# Patient Record
Sex: FEMALE | Race: ASIAN | NOT HISPANIC OR LATINO | URBAN - METROPOLITAN AREA
[De-identification: names, ages, dates, MRNs, and addresses within clinical notes are randomized per-mention and may not be internally consistent; named-entity substitution may affect disease eponyms.]

---

## 2021-05-20 ENCOUNTER — EMERGENCY (EMERGENCY)
Facility: HOSPITAL | Age: 56
LOS: 1 days | Discharge: HOME | End: 2021-05-20
Attending: EMERGENCY MEDICINE | Admitting: EMERGENCY MEDICINE
Payer: SELF-PAY

## 2021-05-20 VITALS
HEART RATE: 76 BPM | DIASTOLIC BLOOD PRESSURE: 85 MMHG | SYSTOLIC BLOOD PRESSURE: 173 MMHG | OXYGEN SATURATION: 98 % | RESPIRATION RATE: 18 BRPM | TEMPERATURE: 98 F

## 2021-05-20 VITALS
DIASTOLIC BLOOD PRESSURE: 85 MMHG | HEART RATE: 67 BPM | RESPIRATION RATE: 19 BRPM | OXYGEN SATURATION: 99 % | SYSTOLIC BLOOD PRESSURE: 145 MMHG

## 2021-05-20 PROCEDURE — 73130 X-RAY EXAM OF HAND: CPT

## 2021-05-20 PROCEDURE — 73030 X-RAY EXAM OF SHOULDER: CPT

## 2021-05-20 PROCEDURE — 99284 EMERGENCY DEPT VISIT MOD MDM: CPT | Mod: 25

## 2021-05-20 PROCEDURE — 72170 X-RAY EXAM OF PELVIS: CPT | Mod: 26

## 2021-05-20 PROCEDURE — 90715 TDAP VACCINE 7 YRS/> IM: CPT

## 2021-05-20 PROCEDURE — 72170 X-RAY EXAM OF PELVIS: CPT

## 2021-05-20 PROCEDURE — 90471 IMMUNIZATION ADMIN: CPT

## 2021-05-20 PROCEDURE — 99284 EMERGENCY DEPT VISIT MOD MDM: CPT

## 2021-05-20 PROCEDURE — 72100 X-RAY EXAM L-S SPINE 2/3 VWS: CPT

## 2021-05-20 PROCEDURE — 72100 X-RAY EXAM L-S SPINE 2/3 VWS: CPT | Mod: 26

## 2021-05-20 PROCEDURE — 73130 X-RAY EXAM OF HAND: CPT | Mod: 26,RT

## 2021-05-20 PROCEDURE — 73030 X-RAY EXAM OF SHOULDER: CPT | Mod: 26,RT

## 2021-05-20 RX ORDER — TETANUS TOXOID, REDUCED DIPHTHERIA TOXOID AND ACELLULAR PERTUSSIS VACCINE, ADSORBED 5; 2.5; 8; 8; 2.5 [IU]/.5ML; [IU]/.5ML; UG/.5ML; UG/.5ML; UG/.5ML
0.5 SUSPENSION INTRAMUSCULAR ONCE
Refills: 0 | Status: COMPLETED | OUTPATIENT
Start: 2021-05-20 | End: 2021-05-20

## 2021-05-20 RX ADMIN — TETANUS TOXOID, REDUCED DIPHTHERIA TOXOID AND ACELLULAR PERTUSSIS VACCINE, ADSORBED 0.5 MILLILITER(S): 5; 2.5; 8; 8; 2.5 SUSPENSION INTRAMUSCULAR at 11:50
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
## 2021-05-20 NOTE — ED ADULT NURSE NOTE - CHPI ED NUR SYMPTOMS NEG
no acting out behaviors/no bruising/no crying/no difficulty bearing weight/no dizziness/no headache/no laceration/no loss of consciousness/no neck tenderness

## 2021-05-20 NOTE — ED PROVIDER NOTE - NSFOLLOWUPINSTRUCTIONS_ED_ALL_ED_FT
Take tylenol or ibuprofen for pain. You will have more pain tonight and tomorrow, this is normal. Drink plenty of fluids. You received a tetanus booster today  Contusion in Adults    AMBULATORY CARE:    A contusion is a bruise that appears on your skin after an injury. A bruise happens when small blood vessels tear but skin does not. Blood leaks into nearby tissue, such as soft tissue or muscle.    Other signs and symptoms you may have with a contusion:   •Pain that increases when you touch the bruise, walk, or use the area around the bruise      •Swelling or a lump at the site of the bruise or near it      •Red, blue, or black skin that may change to green or yellow after a few days      •Stiffness or problems moving the bruised area of your body      Seek care immediately if:   •You have new trouble moving the injured area.      •You have tingling or numbness in or near the injured area.      •Your hand or foot below the bruise gets cold or turns pale.       Call your doctor if:   •You find a new lump in the injured area.      •Your symptoms do not improve with treatment after 4 to 5 days.      •You have questions or concerns about your condition or care.      Treatment may not be needed. The following may be needed if you have a serious injury:  •NSAIDs, such as ibuprofen, help decrease swelling, pain, and fever. This medicine is available with or without a doctor's order. NSAIDs can cause stomach bleeding or kidney problems in certain people. If you take blood thinner medicine, always ask your healthcare provider if NSAIDs are safe for you. Always read the medicine label and follow directions.      •Prescription pain medicine may be given. Ask your healthcare provider how to take this medicine safely. Some prescription pain medicines contain acetaminophen. Do not take other medicines that contain acetaminophen without talking to your healthcare provider. Too much acetaminophen may cause liver damage. Prescription pain medicine may cause constipation. Ask your healthcare provider how to prevent or treat constipation.       •Aspiration is a procedure to drain pooled blood in your muscle. This may help prevent increased pressure in the muscle.      •Surgery may be done to repair a tear in the muscle or relieve pressure in the muscle caused by swelling.      Help a contusion heal:   •Rest the injured area or use it less than usual. If you bruised your leg or foot, you may need crutches or a cane to help you walk. This will help you keep weight off your injured body part.      •Apply ice to decrease swelling and pain. Ice may also help prevent tissue damage. Use an ice pack, or put crushed ice in a plastic bag. Cover it with a towel and place it on your bruise for 15 to 20 minutes every hour or as directed.      •Use compression to support the area and decrease swelling. Wrap an elastic bandage around the area over the bruised muscle. Make sure the bandage is not too tight. You should be able to fit 1 finger between the bandage and your skin.      •Elevate (raise) your injured body part above the level of your heart to help decrease pain and swelling. Use pillows, blankets, or rolled towels to elevate the area as often as you can.      •Do not drink alcohol as directed. Alcohol may slow healing.      •Do not stretch injured muscles right after your injury. Ask your healthcare provider when and how you may safely stretch after your injury. Gentle stretches can help increase your flexibility.      •Do not massage the area or put heating pads on the bruise right after your injury. Heat and massage may slow healing. Your healthcare provider may tell you to apply heat after several days. At that time, heat will start to help the injury heal.      Follow up with your doctor as directed: Write down your questions so you remember to ask them during your visits.

## 2021-05-20 NOTE — ED PROVIDER NOTE - SKIN WOUND DESCRIPTION
abrasion to R forearm, tenderness to 5th MCP joint, abrasion to R posterior thigh beneath buttocks, no bruising elsewhere
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
